# Patient Record
Sex: FEMALE | Race: WHITE | NOT HISPANIC OR LATINO | Employment: FULL TIME | URBAN - METROPOLITAN AREA
[De-identification: names, ages, dates, MRNs, and addresses within clinical notes are randomized per-mention and may not be internally consistent; named-entity substitution may affect disease eponyms.]

---

## 2021-06-04 LAB — HBA1C MFR BLD: 7.2 %

## 2021-06-17 ENCOUNTER — TELEPHONE (OUTPATIENT)
Dept: ENDOCRINOLOGY | Facility: CLINIC | Age: 66
End: 2021-06-17

## 2021-06-17 ENCOUNTER — OFFICE VISIT (OUTPATIENT)
Dept: ENDOCRINOLOGY | Facility: CLINIC | Age: 66
End: 2021-06-17

## 2021-06-17 VITALS
WEIGHT: 180.8 LBS | HEIGHT: 68 IN | DIASTOLIC BLOOD PRESSURE: 64 MMHG | OXYGEN SATURATION: 98 % | HEART RATE: 60 BPM | SYSTOLIC BLOOD PRESSURE: 120 MMHG | BODY MASS INDEX: 27.4 KG/M2

## 2021-06-17 DIAGNOSIS — E04.2 MULTIPLE THYROID NODULES: Primary | ICD-10-CM

## 2021-06-17 DIAGNOSIS — E11.9 TYPE 2 DIABETES MELLITUS WITHOUT COMPLICATION, WITHOUT LONG-TERM CURRENT USE OF INSULIN (HCC): ICD-10-CM

## 2021-06-17 PROBLEM — E78.2 MIXED HYPERLIPIDEMIA: Status: ACTIVE | Noted: 2021-06-17

## 2021-06-17 PROBLEM — I10 ESSENTIAL HYPERTENSION: Status: ACTIVE | Noted: 2021-06-17

## 2021-06-17 PROBLEM — I73.9 PERIPHERAL ARTERIAL DISEASE (HCC): Status: ACTIVE | Noted: 2021-06-17

## 2021-06-17 PROBLEM — I87.8 VENOUS STASIS: Status: ACTIVE | Noted: 2021-06-17

## 2021-06-17 PROBLEM — B35.1 ONYCHOMYCOSIS: Status: ACTIVE | Noted: 2021-06-17

## 2021-06-17 PROBLEM — Z86.010 HISTORY OF COLONIC POLYPS: Status: ACTIVE | Noted: 2021-06-17

## 2021-06-17 PROBLEM — I47.1 SVT (SUPRAVENTRICULAR TACHYCARDIA) (HCC): Status: ACTIVE | Noted: 2021-06-17

## 2021-06-17 PROCEDURE — 99204 OFFICE O/P NEW MOD 45 MIN: CPT | Performed by: INTERNAL MEDICINE

## 2021-06-17 RX ORDER — ATENOLOL 100 MG/1
100 TABLET ORAL DAILY
COMMUNITY
Start: 2021-06-03

## 2021-06-17 RX ORDER — LINAGLIPTIN 5 MG/1
5 TABLET, FILM COATED ORAL DAILY
COMMUNITY
Start: 2021-06-11 | End: 2021-09-20 | Stop reason: ALTCHOICE

## 2021-06-17 RX ORDER — ATORVASTATIN CALCIUM 20 MG/1
20 TABLET, FILM COATED ORAL DAILY
COMMUNITY
Start: 2021-06-03

## 2021-06-17 RX ORDER — GLYBURIDE 5 MG/1
5 TABLET ORAL DAILY
COMMUNITY
Start: 2021-06-03

## 2021-06-17 RX ORDER — DIGOXIN 250 MCG
250 TABLET ORAL DAILY
COMMUNITY
Start: 2021-05-07

## 2021-06-17 NOTE — PROGRESS NOTES
Chief Complaint   Patient presents with   • Establish Care   • Thyroid Problem        Referring Provider  Karla Gibson DO HPI   Fawn Dobbs is a 66 y.o. female had concerns including Establish Care and Thyroid Problem.      Felt like she had a sore throat constantly from December on. Felt low and deep and PCP ordered an US showing multiple thyroid nodules. Had FNA of the largest right nodule which was benign. No family history of thyroid cancer. No history of radiation treatments.     PCP is wanting her to start a GLP-1 agonist for DM.    DM was diagnosed about 5 years ago.   A1c the first part of the month and A1c 7.2.   Currently on glyburide and tradjenta.   Was on jardiance but had recurrent yeast infections. Didn't tolerate metformin due to upper abdomen discomfort that never resolved.   Checks her BGs TID. Range 70s-100s most of the time. Rare > 200 depending on diet.  Trying to cut back on carbs (potatoes, breads, pastas). Cutting back on portions of carbs as she doesn't want to add any more medications.  Last eye exam: Feb/March 2021 - [facility] [place] [facility] [place]      Has occasional night sweats. Suspects this could be low BGs. Has snacked before bed to avoid the low BGs.     Has also changed her activity. 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 She is now eating supper earlier and is more active in the evening.     Past Medical History:   Diagnosis Date   • Back problem    • Bladder infection    • Claustrophobia    • Hx of migraines    • Hyperlipidemia    • Thyroid nodule    • Type 2 diabetes mellitus (CMS/HCC)      Past Surgical History:   Procedure Laterality Date   • APPENDECTOMY  2006       • CHOLECYSTECTOMY  10/1982    [place]   • HYSTERECTOMY  2003    [facility].      Family History   Problem Relation Age of Onset   • Alzheimer's disease Mother    • Hypertension Mother    • Prostate cancer Father    • Diabetes Father    • Heart disease Brother    • Breast cancer  "Maternal Aunt       Social History     Socioeconomic History   • Marital status:      Spouse name: Not on file   • Number of children: Not on file   • Years of education: Not on file   • Highest education level: Not on file   Tobacco Use   • Smoking status: Never Smoker   • Smokeless tobacco: Never Used   Vaping Use   • Vaping Use: Never used   Substance and Sexual Activity   • Alcohol use: Not Currently   • Drug use: Never   • Sexual activity: Defer      Allergies   Allergen Reactions   • Metformin Diarrhea   • Methylprednisolone Other (See Comments)     Heart flutters      Current Outpatient Medications on File Prior to Visit   Medication Sig Dispense Refill   • atenolol (TENORMIN) 100 MG tablet Take 100 mg by mouth Daily.     • atorvastatin (LIPITOR) 20 MG tablet Take 20 mg by mouth Daily.     • digoxin (LANOXIN) 250 MCG tablet Take 250 mcg by mouth Daily.     • glyburide (DIAbeta) 5 MG tablet Take 5 mg by mouth Daily.     • Multiple Vitamins-Minerals (CENTRUM MULTI + OMEGA 3 PO) Take 1 tablet by mouth Daily.     • Tradjenta 5 MG tablet tablet Take 5 mg by mouth Daily.       No current facility-administered medications on file prior to visit.        Review of Systems   Constitutional: Positive for diaphoresis and fatigue.   HENT: Positive for ear pain, rhinorrhea, sinus pressure and sore throat.    Eyes: Negative.    Respiratory: Negative.    Cardiovascular: Positive for palpitations and leg swelling.   Gastrointestinal: Positive for diarrhea.   Endocrine: Positive for polyphagia.        Hair loss   Genitourinary: Positive for urgency.   Musculoskeletal: Positive for back pain and neck pain.   Skin: Negative.    Allergic/Immunologic: Positive for environmental allergies.   Neurological: Positive for headache.   Hematological: Negative.    Psychiatric/Behavioral: Positive for decreased concentration and sleep disturbance.          /64   Pulse 60   Ht 172.7 cm (68\")   Wt 82 kg (180 lb 12.8 oz)   " SpO2 98%   BMI 27.49 kg/m²      Physical Exam  Cardiovascular:      Pulses:           Dorsalis pedis pulses are 2+ on the right side and 2+ on the left side.   Feet:      Right foot:      Skin integrity: Skin integrity normal.      Toenail Condition: Right toenails are normal.      Left foot:      Skin integrity: Skin integrity normal.      Toenail Condition: Fungal disease present.     Comments: Diabetic Foot Exam Performed and Monofilament Test Performed    Monofilament 5/5 bilaterally         Constitutional:  well developed; well nourished  no acute distress   ENT/Thyroid: palpable nodules (right 2-3 cm nodule, no lymphadenopathy)   Eyes: EOM intact  Conjunctiva: clear   Respiratory:  breathing is unlabored  clear to auscultation bilaterally   Cardiovascular:  lower extremity edema: 1+ edema  regular rate and rhythm, S1, S2 normal, no murmur, click, rub or gallop   Chest:  Not performed.   Abdomen: Not performed.   : Not performed.   Musculoskeletal: negative findings:  ROM of all joints is normal, no deformities present   Skin: dry and warm   Neuro: normal without focal findings and mental status, speech normal, alert and oriented x3   Psych: oriented to time, place and person, mood and affect are within normal limits     Labs/Imaging    Thyroid ultrasound report reviewed from 1/28/2021 indicating a right lobe nodule measuring 3.0 x 1.4 x 2.4 cm, left lobe nodule 7 x 9 x 5 mm, left nodule 9 x 6 x 8 mm, left nodule 5 x 4 x 5 mm  FNA 2/1/2021 suggested to be benign    Assessment and Plan    Diagnoses and all orders for this visit:    1. Multiple thyroid nodules (Primary)  Thyroid ultrasound report reviewed from 1/28/2021 indicating a right lobe nodule measuring 3.0 x 1.4 x 2.4 cm, left lobe nodule 7 x 9 x 5 mm, left nodule 9 x 6 x 8 mm, left nodule 5 x 4 x 5 mm. FNA of the 3 cm right lobe nodule 2/1/2021 suggested to be benign.  Thyroid function reportedly normal. Will request labs.   Repeat US in the office 1  year from last.     2. Type 2 diabetes mellitus without complication, without long-term current use of insulin (CMS/[facility])  Uncontrolled with A1c recently 7.2 and occasional hyper and hypoglycemia.  Continue glyburide though decrease to 2.5 mg daily.  Continue Tradjenta.  History of GI intolerance to Metformin.  History of intolerance to Jardiance due to recurrent yeast infections.  With A1c 7.2 in recent dietary changes, additional medication is not needed at this time.  Discussed that with history of benign FNA and no family history of MEN or MTC, GLP-1 agonist would be an option in the future, but can defer for now.  Continue monitoring BG daily and call the office if persistently greater than 180.  Labs will be requested from PCPs office.  Ophtho exam is up-to-date from February or March.  Monofilament was updated in the office today.     **Addendum 6/17/2021:  Labs from 10/25/2020 showed A1c 7.5, LDL 70, triglycerides 255, total cholesterol 156, HDL 35, TSH 1.14, free T4 0.94      Return in about 3 months (around 9/17/2021) for next scheduled follow up. The patient was instructed to contact the clinic with any interval questions or concerns.    Renetta Lake, DO   Endocrinologist    Please note that portions of this document were completed with a voice recognition program. Efforts were made to edit the dictations, but occasionally words are mis-transcribed.

## 2021-09-20 ENCOUNTER — OFFICE VISIT (OUTPATIENT)
Dept: ENDOCRINOLOGY | Facility: CLINIC | Age: 66
End: 2021-09-20

## 2021-09-20 ENCOUNTER — LAB (OUTPATIENT)
Dept: LAB | Facility: HOSPITAL | Age: 66
End: 2021-09-20

## 2021-09-20 VITALS
BODY MASS INDEX: 27.89 KG/M2 | SYSTOLIC BLOOD PRESSURE: 122 MMHG | WEIGHT: 184 LBS | OXYGEN SATURATION: 98 % | HEIGHT: 68 IN | HEART RATE: 86 BPM | RESPIRATION RATE: 16 BRPM | DIASTOLIC BLOOD PRESSURE: 78 MMHG

## 2021-09-20 DIAGNOSIS — E04.2 MULTIPLE THYROID NODULES: ICD-10-CM

## 2021-09-20 DIAGNOSIS — E11.69 TYPE 2 DIABETES MELLITUS WITH OTHER SPECIFIED COMPLICATION, WITHOUT LONG-TERM CURRENT USE OF INSULIN (HCC): Primary | ICD-10-CM

## 2021-09-20 LAB
EXPIRATION DATE: ABNORMAL
EXPIRATION DATE: NORMAL
GLUCOSE BLDC GLUCOMTR-MCNC: 242 MG/DL (ref 70–130)
HBA1C MFR BLD: 7.2 %
Lab: ABNORMAL
Lab: NORMAL

## 2021-09-20 PROCEDURE — 99214 OFFICE O/P EST MOD 30 MIN: CPT | Performed by: INTERNAL MEDICINE

## 2021-09-20 PROCEDURE — 82570 ASSAY OF URINE CREATININE: CPT | Performed by: INTERNAL MEDICINE

## 2021-09-20 PROCEDURE — 82043 UR ALBUMIN QUANTITATIVE: CPT | Performed by: INTERNAL MEDICINE

## 2021-09-20 RX ORDER — ORAL SEMAGLUTIDE 3 MG/1
3 TABLET ORAL DAILY
Qty: 30 TABLET | Refills: 5 | Status: SHIPPED | OUTPATIENT
Start: 2021-09-20 | End: 2021-10-26 | Stop reason: ALTCHOICE

## 2021-09-20 NOTE — PATIENT INSTRUCTIONS
Discontinue tradjenta.     Start rybelsus 3 mg daily - 30 minutes before first meal. Call the office in a month if tolerated and I'll increase the dose.     If this isn't covered we will try Trulicity or Ozempic.     If your glucose levels are < 100 often, call the office and/or discontinue the glyburide.

## 2021-09-20 NOTE — PROGRESS NOTES
"Chief Complaint   Patient presents with   • type 2 diabetes   • multiple thyroid nodules          HPI   Fawn Dobbs is a 66 y.o. female had concerns including type 2 diabetes and multiple thyroid nodules.      A1c stable at 7.2.  Checks BG's 2-4 times per day.  Most BG's in the 100s.  Occasional 80s to 200s.   today.  She had a burger and a roll with a diet soda.    Is on glyburide 5 mg daily and Tradjenta 5 mg daily.    Has gained 4 pounds since her last visit here.  Would like to lose weight.    The following portions of the patient's history were reviewed and updated as appropriate: allergies, current medications, past family history, past medical history, past social history, past surgical history and problem list.      Review of Systems   Constitutional: Positive for unexpected weight gain.   Endocrine:        See HPI        Physical Exam  Vitals reviewed.   Constitutional:       Appearance: Normal appearance.   Cardiovascular:      Rate and Rhythm: Normal rate.   Pulmonary:      Effort: Pulmonary effort is normal.   Musculoskeletal:         General: Swelling present.   Neurological:      General: No focal deficit present.      Mental Status: She is alert. Mental status is at baseline.   Psychiatric:         Mood and Affect: Mood normal.         Behavior: Behavior normal.        /78   Pulse 86   Resp 16   Ht 172.7 cm (68\")   Wt 83.5 kg (184 lb)   SpO2 98%   BMI 27.98 kg/m²      Labs and imaging    HbA1c:  Lab Results   Component Value Date    HGBA1C 7.2 09/20/2021    HGBA1C 7.2 06/04/2021     Glucose:    Lab Results   Component Value Date    POCGLU 242 (A) 09/20/2021     Labs from 10/25/2020 showed A1c 7.5, LDL 70, triglycerides 255, total cholesterol 156, HDL 35, TSH 1.14, free T4 0.94    Assessment and plan  Diagnoses and all orders for this visit:    1. Type 2 diabetes mellitus with other specified complication, without long-term current use of insulin (CMS/[facility]) (Primary)  Uncontrolled " with A1c 7.2 and occasional hyperglycemia.  We discussed GLP-1 agonist on her last visit but patient was reluctant to make changes at the time.  Discussed again today.  She has no personal history of pancreatitis and no family history of MEN or MTC.  Discontinue Tradjenta.  Start Rybelsus and titrate up monthly if tolerated.  If insurance does not cover, will change to Trulicity.  Cautioned regarding possible GI side effects.  For now, remain on glyburide 5 mg daily.  Will decrease/discontinue if able.  History of GI intolerance to Metformin.  History of yeast infection on Jardiance.  Labs updated 10/25/2020.  Urine microalbumin is due and will be checked today.  Ophtho exam is up-to-date from March 2021.  Monofilament was updated June 2021.  -     POCT Glucose  -     POC Glycosylated Hemoglobin (Hb A1C)  -     Semaglutide (Rybelsus) 3 MG tablet; Take 1 tablet by mouth Daily.  Dispense: 30 tablet; Refill: 5  -     Microalbumin / Creatinine Urine Ratio - Urine, Clean Catch    2. Multiple thyroid nodules  Thyroid ultrasound report reviewed from 1/28/2021 indicating a right lobe nodule measuring 3.0 x 1.4 x 2.4 cm, left lobe nodule 7 x 9 x 5 mm, left nodule 9 x 6 x 8 mm, left nodule 5 x 4 x 5 mm. FNA of the 3 cm right lobe nodule 2/1/2021 suggested to be benign.  Thyroid function normal.    Repeat US in the office at next visit.     Return in about 4 months (around 1/20/2022) for next scheduled follow up, with thyroid ultrasound. The patient was instructed to contact the clinic with any interval questions or concerns.    Renetta Lake,    Endocrinologist    Please note that portions of this document were completed with a voice recognition program. Efforts were made to edit the dictations, but occasionally words are mis-transcribed.

## 2021-09-21 LAB
ALBUMIN UR-MCNC: <1.2 MG/DL
CREAT UR-MCNC: 73.8 MG/DL
MICROALBUMIN/CREAT UR: NORMAL MG/G{CREAT}

## 2021-09-22 ENCOUNTER — PRIOR AUTHORIZATION (OUTPATIENT)
Dept: ENDOCRINOLOGY | Facility: CLINIC | Age: 66
End: 2021-09-22

## 2021-09-22 NOTE — TELEPHONE ENCOUNTER
RUBI TORRES (Man: [id number])  Rybelsus 3MG tablets     Form  [employer] Electronic Prior Authorization Form (2017 [employer])  Created  3 hours ago  Sent to Plan  3 hours ago  Plan Response  3 hours ago  Submit Clinical Questions  3 hours ago  Determination  Favorable  3 hours ago  Message from Plan  Request Reference Number: [id number]. RYBELSUS TAB 3MG is approved through 09/22/2022. Your patient may now fill this prescription and it will be covered.

## 2021-10-26 ENCOUNTER — TELEPHONE (OUTPATIENT)
Dept: ENDOCRINOLOGY | Facility: CLINIC | Age: 66
End: 2021-10-26

## 2021-10-26 DIAGNOSIS — E11.69 TYPE 2 DIABETES MELLITUS WITH OTHER SPECIFIED COMPLICATION, WITHOUT LONG-TERM CURRENT USE OF INSULIN (HCC): Primary | ICD-10-CM

## 2021-10-26 RX ORDER — ORAL SEMAGLUTIDE 7 MG/1
7 TABLET ORAL
Qty: 30 TABLET | Refills: 5 | Status: SHIPPED | OUTPATIENT
Start: 2021-10-26 | End: 2021-11-11 | Stop reason: SDUPTHER

## 2021-10-26 NOTE — TELEPHONE ENCOUNTER
PATIENT STATES THAT SHE WAS TO REPORT TO DR AUSTIN REGARDING HOW SHE IS DOING ONCE BEGINNING A LOW DOSE OF RYBELSUS. SHE STATES THAT SHE SEEMS TO BE TOLERATING IT WELL AT THIS TIME AND WOULD LIKE TO GO AHEAD AND HAVE DR AUSTIN INCREASE HER DOSE.     SHE HAS ALSO BEEN TAKING GLYBURIDE AND WOULD LIKE TO KNOW IF SHE NEEDS TO CONTINUE TAKING GLYBURIDE ONCE RYBELSUS DOSE IS INCREASED.     CALL BACK [phone]

## 2021-11-10 DIAGNOSIS — E11.69 TYPE 2 DIABETES MELLITUS WITH OTHER SPECIFIED COMPLICATION, WITHOUT LONG-TERM CURRENT USE OF INSULIN (HCC): ICD-10-CM

## 2021-11-10 NOTE — TELEPHONE ENCOUNTER
PT CALLED WANTED TO KNOW IF SHE CAN GET A 90 DAY SUPPLY OF RYBELSUS TO BE SENT IN. SHE SAID HER NUMBERS ARE UNDER 100.

## 2021-11-11 RX ORDER — ORAL SEMAGLUTIDE 7 MG/1
7 TABLET ORAL
Qty: 30 TABLET | Refills: 5 | Status: SHIPPED | OUTPATIENT
Start: 2021-11-11